# Patient Record
Sex: FEMALE | Race: WHITE | NOT HISPANIC OR LATINO
[De-identification: names, ages, dates, MRNs, and addresses within clinical notes are randomized per-mention and may not be internally consistent; named-entity substitution may affect disease eponyms.]

---

## 2018-04-18 ENCOUNTER — RESULT REVIEW (OUTPATIENT)
Age: 28
End: 2018-04-18

## 2019-04-09 ENCOUNTER — TRANSCRIPTION ENCOUNTER (OUTPATIENT)
Age: 29
End: 2019-04-09

## 2019-04-18 ENCOUNTER — RESULT REVIEW (OUTPATIENT)
Age: 29
End: 2019-04-18

## 2019-05-28 ENCOUNTER — RESULT REVIEW (OUTPATIENT)
Age: 29
End: 2019-05-28

## 2019-09-30 ENCOUNTER — EMERGENCY (EMERGENCY)
Facility: HOSPITAL | Age: 29
LOS: 1 days | Discharge: HOME | End: 2019-09-30
Attending: EMERGENCY MEDICINE | Admitting: EMERGENCY MEDICINE
Payer: COMMERCIAL

## 2019-09-30 VITALS
RESPIRATION RATE: 16 BRPM | TEMPERATURE: 98 F | HEART RATE: 68 BPM | SYSTOLIC BLOOD PRESSURE: 110 MMHG | DIASTOLIC BLOOD PRESSURE: 67 MMHG | OXYGEN SATURATION: 98 %

## 2019-09-30 VITALS
RESPIRATION RATE: 18 BRPM | TEMPERATURE: 98 F | WEIGHT: 134.92 LBS | DIASTOLIC BLOOD PRESSURE: 79 MMHG | OXYGEN SATURATION: 100 % | HEIGHT: 69 IN | HEART RATE: 108 BPM | SYSTOLIC BLOOD PRESSURE: 123 MMHG

## 2019-09-30 LAB — HCG SERPL-ACNC: <1 MIU/ML — SIGNIFICANT CHANGE UP

## 2019-09-30 PROCEDURE — 84702 CHORIONIC GONADOTROPIN TEST: CPT

## 2019-09-30 PROCEDURE — 99283 EMERGENCY DEPT VISIT LOW MDM: CPT

## 2019-09-30 PROCEDURE — 96375 TX/PRO/DX INJ NEW DRUG ADDON: CPT

## 2019-09-30 PROCEDURE — 96361 HYDRATE IV INFUSION ADD-ON: CPT

## 2019-09-30 PROCEDURE — 36415 COLL VENOUS BLD VENIPUNCTURE: CPT

## 2019-09-30 PROCEDURE — 99284 EMERGENCY DEPT VISIT MOD MDM: CPT | Mod: 25

## 2019-09-30 PROCEDURE — 96374 THER/PROPH/DIAG INJ IV PUSH: CPT

## 2019-09-30 RX ORDER — METOCLOPRAMIDE HCL 10 MG
10 TABLET ORAL ONCE
Refills: 0 | Status: COMPLETED | OUTPATIENT
Start: 2019-09-30 | End: 2019-09-30

## 2019-09-30 RX ORDER — KETOROLAC TROMETHAMINE 30 MG/ML
30 SYRINGE (ML) INJECTION ONCE
Refills: 0 | Status: DISCONTINUED | OUTPATIENT
Start: 2019-09-30 | End: 2019-09-30

## 2019-09-30 RX ORDER — SODIUM CHLORIDE 9 MG/ML
1000 INJECTION INTRAMUSCULAR; INTRAVENOUS; SUBCUTANEOUS ONCE
Refills: 0 | Status: COMPLETED | OUTPATIENT
Start: 2019-09-30 | End: 2019-09-30

## 2019-09-30 RX ADMIN — Medication 30 MILLIGRAM(S): at 15:27

## 2019-09-30 RX ADMIN — SODIUM CHLORIDE 1000 MILLILITER(S): 9 INJECTION INTRAMUSCULAR; INTRAVENOUS; SUBCUTANEOUS at 15:27

## 2019-09-30 RX ADMIN — Medication 30 MILLIGRAM(S): at 15:55

## 2019-09-30 RX ADMIN — Medication 10 MILLIGRAM(S): at 14:45

## 2019-09-30 RX ADMIN — SODIUM CHLORIDE 1000 MILLILITER(S): 9 INJECTION INTRAMUSCULAR; INTRAVENOUS; SUBCUTANEOUS at 14:30

## 2019-09-30 NOTE — ED PROVIDER NOTE - PHYSICAL EXAMINATION
Neuro- A&Ox3. Speech clear, without articulation or word-finding difficulties. Eyes- PERRL bilaterally. EOMs in tact. No nystagmus. CN II-XII in tact. No facial droop. No sensory or motor deficits throughout extremities bilaterally. Strength 5/5 throughout upper and lower extremities. No pronator drift. finger-to-nose in tact.

## 2019-09-30 NOTE — ED ADULT NURSE NOTE - EENT ASSESSMENT, MLM
Acetic acid was discontinued at her visit on 2/8/17 and topical wound care was changed to Iodoflex. If patient is requesting this refill, she should call the clinic to discuss.    JANNETH Maddox  
Moi is looking for a refill on   Acetic acid 25% irrig soln.  Not in our MAR  It states to soak wound for 15 minutes prior to application of juan 3 times weekly.  Please advise..  
Received call from [facility] stating a written order was received to continue acetic acid soaks prior to application of Iodoflex. Reviewed patient's paper chart and order found. Rx for Acetic acid 0.25% solution was sent to [facility]'s pharmacy.    JANNETH Maddox  
This should be renewed by the ordering provider who is Alli Mc NP. I will forward this to her.   
- - -

## 2019-09-30 NOTE — ED ADULT NURSE NOTE - OBJECTIVE STATEMENT
pt c/o sudden onset of headache, left eye pain and blurred vision -- with nausea and vomiting - pt with hx of migraines but states she never vomited before with headache -

## 2019-09-30 NOTE — ED PROVIDER NOTE - OBJECTIVE STATEMENT
pt is a 27yo female with pmhx of migraines c/o headache x today. pt reports acute onset of headache with associated eye pain and blurred vision. pt reports 2 episodes of vomiting without blood. pt reports this is her normal migraine except for the vomiting. pt took excedrin for symptoms which provided minimal relief. pt has not seen neuro in "years".

## 2019-09-30 NOTE — ED PROVIDER NOTE - PATIENT PORTAL LINK FT
You can access the FollowMyHealth Patient Portal offered by [facility] by registering at the following website: [email or web address]. By joining [employer]’s FollowMyHealth portal, you will also be able to view your health information using other applications (apps) compatible with our system.

## 2019-09-30 NOTE — ED PROVIDER NOTE - PROGRESS NOTE DETAILS
pt improved. advised neuro follow up. All  labs reviewed. all results reviewed with pt including abnormal results. pt given a copy of results. pt advised to follow up with pmd regarding abnormal results. All questions answered and concerns addressed. pt verbalized understanding and agreement with plan and dx. pt advised on next step and when/where to follow up. pt advised on all take home and otc medications. pt advised to follow up with PMD. pt advised to return to ed for worsenng symptoms including fever, cp, sob. will dc.

## 2019-09-30 NOTE — ED PROVIDER NOTE - CHPI ED SYMPTOMS NEG
no change in level of consciousness/no loss of consciousness/no numbness/no confusion/no weakness/no dizziness/no fever

## 2019-09-30 NOTE — ED PROVIDER NOTE - ATTENDING CONTRIBUTION TO CARE
I have personally performed a face to face diagnostic evaluation on this patient.  I have reviewed the PA note and agree with the history, exam, and plan of care, except as noted.  History and Exam by me shows  left-sided headache from migraine headache c vomiting x 2 today.  Last episode months ago.  HO of headache since childhood.  photophobia.   head nc/at.  lu bl.  pt is feeling better p iv reglan.

## 2019-10-01 RX ORDER — NORETHINDRONE AND ETHINYL ESTRADIOL 0.4-0.035
0 KIT ORAL
Qty: 0 | Refills: 0 | DISCHARGE

## 2019-10-09 NOTE — ED ADULT TRIAGE NOTE - WEIGHT IN LBS
SURGICAL SCHEDULING REQUEST  Atilio Sanchez MD    Diagnosis:   Right  Otosclerosis    Allergies:  ALLERGIES:   Allergen Reactions   • Penicillin G HIVES       BMI:  31.38    Procedure:   Right  Middle Ear Exploration  Middle ear exploration through postauricular or ear canal incision  [17680]  Stapedotomy   Stapedectomy or stapedotomy with reestablishment of ossicular continuity, with or without use of foreign material  [59541]    Surgical Time: 45 MINUTES    Special Surgical Equipment Needed:  Facial Nerve Monitor  CO2 Laser  Surgical [facility] (any BMIs >50 to be done at [facility]    Anesthesia:  General    Other Pertinent Patient Information:  Diabetes: No  Anticoagulation: No    Day of Surgery Medications:  Clindamycin  900 mg  IV  (If Beta-Lactam Allergic)    Needs CXR? No    Needs Medical Clearance? Yes    Needs Cardiac, Pulmonary or other specialty clearance? No     134.9

## 2020-06-30 ENCOUNTER — RESULT REVIEW (OUTPATIENT)
Age: 30
End: 2020-06-30

## 2020-08-22 ENCOUNTER — TRANSCRIPTION ENCOUNTER (OUTPATIENT)
Age: 30
End: 2020-08-22

## 2021-07-07 ENCOUNTER — RESULT REVIEW (OUTPATIENT)
Age: 31
End: 2021-07-07

## 2021-10-05 PROBLEM — G43.909 MIGRAINE, UNSPECIFIED, NOT INTRACTABLE, WITHOUT STATUS MIGRAINOSUS: Chronic | Status: ACTIVE | Noted: 2019-09-30

## 2021-10-05 PROBLEM — Z00.00 ENCOUNTER FOR PREVENTIVE HEALTH EXAMINATION: Status: ACTIVE | Noted: 2021-10-05

## 2021-10-20 ENCOUNTER — APPOINTMENT (OUTPATIENT)
Dept: SURGERY | Facility: CLINIC | Age: 31
End: 2021-10-20

## 2022-06-02 ENCOUNTER — APPOINTMENT (OUTPATIENT)
Dept: SURGERY | Facility: CLINIC | Age: 32
End: 2022-06-02
Payer: COMMERCIAL

## 2022-06-02 VITALS
RESPIRATION RATE: 16 BRPM | HEIGHT: 69 IN | HEART RATE: 83 BPM | OXYGEN SATURATION: 99 % | WEIGHT: 145 LBS | BODY MASS INDEX: 21.48 KG/M2 | DIASTOLIC BLOOD PRESSURE: 75 MMHG | TEMPERATURE: 97.4 F | SYSTOLIC BLOOD PRESSURE: 110 MMHG

## 2022-06-02 DIAGNOSIS — K59.09 OTHER CONSTIPATION: ICD-10-CM

## 2022-06-02 DIAGNOSIS — K60.2 ANAL FISSURE, UNSPECIFIED: ICD-10-CM

## 2022-06-02 DIAGNOSIS — Z80.49 FAMILY HISTORY OF MALIGNANT NEOPLASM OF OTHER GENITAL ORGANS: ICD-10-CM

## 2022-06-02 DIAGNOSIS — Z78.9 OTHER SPECIFIED HEALTH STATUS: ICD-10-CM

## 2022-06-02 DIAGNOSIS — K59.4 ANAL SPASM: ICD-10-CM

## 2022-06-02 PROCEDURE — 99203 OFFICE O/P NEW LOW 30 MIN: CPT

## 2022-06-02 RX ORDER — FLUCONAZOLE 150 MG/1
150 TABLET ORAL
Qty: 1 | Refills: 0 | Status: DISCONTINUED | COMMUNITY
Start: 2022-03-09

## 2022-06-02 NOTE — CONSULT LETTER
[Dear  ___] : Dear ~ALONSO, [Courtesy Letter:] : I had the pleasure of seeing your patient, [unfilled], in my office today. [Please see my note below.] : Please see my note below. [Consult Closing:] : Thank you very much for allowing me to participate in the care of this patient.  If you have any questions, please do not hesitate to contact me. [Sincerely,] : Sincerely, [FreeTextEntry2] : Dr. Jaime Leon [FreeTextEntry3] : Dejan Garcia M.D., CHRISTI.LEXY., F.BRIAN.S.ANGELARMarkS.\[facility] Chief Colorectal Clinical Services, [facility]

## 2022-06-02 NOTE — HISTORY OF PRESENT ILLNESS
[FreeTextEntry1] : Melissa is a 31 year old female here for consultation visit. .\par \par Today patient reports feeling well. States that over the past year she has had issues with constipation. Reports intermittent sharp burning pain with hard BMs. Reports that the pain and itching lingers for a few days after a hard BM. Has used Sitz baths, and hydrocortisone cream with little relief of symptoms. Has had blood dripping into the toilet bowl after BMs when she is straining and has pain.  Currently denies having pain. Has 1 hard BM daily. Denies family history of colorectal cancer. Has never had colonoscopy.

## 2022-06-02 NOTE — ASSESSMENT
[FreeTextEntry1] : I have seen and evaluated patient and I have corroborated all nursing input into this note.  Patient with an intermittent anal fissure.  She has been asymptomatic for the past couple of weeks.  Her fissure occurs when she has hard bowel movements and she has been drinking lots of water and eating a high-fiber diet recently.  Therefore her stool has not been as hard and she has been asymptomatic.  She has failed trials of fiber supplements and MiraLAX in the past.  She plans to try 3-4 stool softeners daily to see if that helps prevent the hard stool and the subsequent fissure symptoms.  I prescribed topical diltiazem cream to use if she becomes symptomatic.  She will follow-up if her symptoms return and are not successfully treated with bowel management and topical diltiazem.

## 2022-06-02 NOTE — PHYSICAL EXAM
[Normal Breath Sounds] : Normal breath sounds [Normal Heart Sounds] : normal heart sounds [No Rash or Lesion] : No rash or lesion [Alert] : alert [Oriented to Person] : oriented to person [Oriented to Place] : oriented to place [Oriented to Time] : oriented to time [Calm] : calm [JVD] : no jugular venous distention  [de-identified] : WNL [de-identified] : WNL [de-identified] : ROM WNL [FreeTextEntry1] : Perianal inspection demonstrated posterior anal canal scarring consistent with a healed fissure.  Digital exam demonstrated posterior anal canal discomfort and sphincter spasm consistent with a fissure.  Anoscopy was deferred because of discomfort.

## 2022-06-30 RX ORDER — NITROGLYCERIN 20 MG/G
2 OINTMENT TOPICAL
Qty: 1 | Refills: 3 | Status: ACTIVE | COMMUNITY
Start: 2022-06-30 | End: 1900-01-01

## 2023-10-25 RX ORDER — DOCUSATE CALCIUM 240 MG
CAPSULE ORAL
Refills: 0 | Status: ACTIVE | COMMUNITY

## 2023-10-26 ENCOUNTER — APPOINTMENT (OUTPATIENT)
Dept: OBGYN | Facility: CLINIC | Age: 33
End: 2023-10-26
Payer: COMMERCIAL

## 2023-10-26 VITALS — SYSTOLIC BLOOD PRESSURE: 116 MMHG | WEIGHT: 159 LBS | DIASTOLIC BLOOD PRESSURE: 60 MMHG | BODY MASS INDEX: 23.48 KG/M2

## 2023-10-26 PROCEDURE — 87210 SMEAR WET MOUNT SALINE/INK: CPT | Mod: QW

## 2023-10-26 PROCEDURE — 99204 OFFICE O/P NEW MOD 45 MIN: CPT | Mod: 25

## 2023-10-26 PROCEDURE — 56820 COLPOSCOPY VULVA: CPT

## 2023-11-03 ENCOUNTER — NON-APPOINTMENT (OUTPATIENT)
Age: 33
End: 2023-11-03

## 2023-11-30 ENCOUNTER — APPOINTMENT (OUTPATIENT)
Dept: OBGYN | Facility: CLINIC | Age: 33
End: 2023-11-30
Payer: COMMERCIAL

## 2023-11-30 VITALS — SYSTOLIC BLOOD PRESSURE: 116 MMHG | DIASTOLIC BLOOD PRESSURE: 62 MMHG

## 2023-11-30 DIAGNOSIS — Z87.19 PERSONAL HISTORY OF OTHER DISEASES OF THE DIGESTIVE SYSTEM: ICD-10-CM

## 2023-11-30 DIAGNOSIS — Z98.890 OTHER SPECIFIED POSTPROCEDURAL STATES: ICD-10-CM

## 2023-11-30 DIAGNOSIS — K59.02 OUTLET DYSFUNCTION CONSTIPATION: ICD-10-CM

## 2023-11-30 DIAGNOSIS — N90.89 OTHER SPECIFIED NONINFLAMMATORY DISORDERS OF VULVA AND PERINEUM: ICD-10-CM

## 2023-11-30 PROCEDURE — 87210 SMEAR WET MOUNT SALINE/INK: CPT | Mod: QW

## 2023-11-30 PROCEDURE — 99213 OFFICE O/P EST LOW 20 MIN: CPT

## 2023-11-30 PROCEDURE — 83986 ASSAY PH BODY FLUID NOS: CPT | Mod: QW

## 2023-12-11 ENCOUNTER — NON-APPOINTMENT (OUTPATIENT)
Age: 33
End: 2023-12-11

## 2023-12-22 ENCOUNTER — APPOINTMENT (OUTPATIENT)
Dept: OBGYN | Facility: CLINIC | Age: 33
End: 2023-12-22
Payer: COMMERCIAL

## 2023-12-22 VITALS — DIASTOLIC BLOOD PRESSURE: 60 MMHG | BODY MASS INDEX: 23.63 KG/M2 | WEIGHT: 160 LBS | SYSTOLIC BLOOD PRESSURE: 100 MMHG

## 2023-12-22 DIAGNOSIS — N89.8 OTHER SPECIFIED NONINFLAMMATORY DISORDERS OF VAGINA: ICD-10-CM

## 2023-12-22 DIAGNOSIS — L29.2 PRURITUS VULVAE: ICD-10-CM

## 2023-12-22 PROCEDURE — 56820 COLPOSCOPY VULVA: CPT

## 2023-12-22 PROCEDURE — 87210 SMEAR WET MOUNT SALINE/INK: CPT | Mod: QW

## 2023-12-22 PROCEDURE — 99213 OFFICE O/P EST LOW 20 MIN: CPT | Mod: 25

## 2023-12-22 RX ORDER — FLUCONAZOLE 200 MG/1
200 TABLET ORAL DAILY
Qty: 30 | Refills: 0 | Status: ACTIVE | COMMUNITY
Start: 2023-12-22 | End: 1900-01-01

## 2023-12-22 NOTE — PLAN
[FreeTextEntry1] : I recommended that she call for the results of her yeast culture in 1 week.  I gave her fluconazole 200 mg p.o. weekly and recommended that she return in 3 months for a repeat evaluation.  Since her itching comes reliably on day 6 of every menses, by that time, if she has not had any itching, her issue can be assumed to be Candida related.  If she continues to itch, her problem can be assumed to be unrelated to Candida (at least some of the time).  The patient is trying to conceive.  If she misses her menses, she should stop the weekly fluconazole.

## 2023-12-22 NOTE — PHYSICAL EXAM
[Chaperone Present] : A chaperone was present in the examining room during all aspects of the physical examination [TextEntry] : ***General*** General Appearance: normal; Judgment and insight: normal; the patient is oriented to time, place and person; Recent and remote memory: normal; Mood and affect: normal; Respiratory effort: normal. ***Pelvic Examination*** External genitalia: the appearance and hair distribution are normal; no lesions are appreciated. Urethra/urethral meatus: no masses or tenderness are appreciated; there is no scarring noted. Bladder: nontender; there is no fullness appreciated; no masses were palpated. Vagina: the vagina is normally rugated; a mucous discharge is seen; no lesions are seen; pelvic support is adequate without any evidence of cystocele or rectocele. Cervix: normal in appearance; no overt lesions are seen. Uterus: Anteverted; normal in size, mobile, nontender, and well supported. Adnexa/parametria: nontender and not enlarged; no masses are palpated. A stool specimen was not indicated at this time.  ***Vaginal Discharge Evaluation*** A saline wet mount and KOH slide evaluation of the vaginal discharge were done. The discharge was mucous; the pH was normal; the whiff test was negative; clue cells were not seen; hyphae were not seen; a moderate number of lactobacilli were seen; a few white blood cells were seen; superficial cells were seen; a candida culture was sent.  ***colposcopy of the vulva*** Colposcopy of the external genitalia showed that the labia majora and labia minora were normal. She identified the introitus as the location of her pain/itching/irritation. There was no vestibular tenderness of the anterior minor vestibular glands, and no vestibular tenderness of the posterior minor vestibular glands. There was no evidence of other dermatopathology. There was no erythema of the introitus. There was moderate to marked pelvic floor spasm on examination.

## 2023-12-22 NOTE — HISTORY OF PRESENT ILLNESS
[FreeTextEntry1] : The patient has a history of recurrent vulvar itching with at least some cultures positive for Candida.  On October 26, her yeast culture was negative but on November 30, her culture was positive for Candida albicans sensitive to fluconazole.  2 weeks ago, her regular gynecologist treated her with Terazol 3.  Her symptoms resolved but now have recurred.

## 2023-12-22 NOTE — ASSESSMENT
[TextEntry] : The patient has recurrent vulvar itching (always on day 6 of her menses).  Some evaluations showed Candida while others were negative.  2 weeks ago, she was treated with Terazol for fluconazole sensitive Candida albicans.  Today, on day 6 of her menses, she is itching but there is no evidence of Candida (pending culture).  She has already eliminated irritants.  She either has occult Candida, hypersensitivity to Candida or another cause of itching. 24  minutes of total time was spent on the date of the encounter. This included time for review of medical records and laboratory results, face to face time (including the physical examination counseling and coordination of care), time for patient education, treatment plans, answering questions, communicating with other doctors and for medical record documentation.  The time spent is separate from time spent on separately billed procedures.

## 2024-09-24 ENCOUNTER — APPOINTMENT (OUTPATIENT)
Dept: PEDIATRICS | Facility: CLINIC | Age: 34
End: 2024-09-24
Payer: COMMERCIAL

## 2024-09-24 PROCEDURE — 99203 OFFICE O/P NEW LOW 30 MIN: CPT

## 2025-06-24 ENCOUNTER — INPATIENT (INPATIENT)
Facility: HOSPITAL | Age: 35
LOS: 0 days | Discharge: HOME | DRG: 395 | End: 2025-06-25
Attending: SURGERY | Admitting: SURGERY
Payer: COMMERCIAL

## 2025-06-24 VITALS
HEIGHT: 69 IN | DIASTOLIC BLOOD PRESSURE: 76 MMHG | WEIGHT: 154.98 LBS | OXYGEN SATURATION: 98 % | HEART RATE: 92 BPM | RESPIRATION RATE: 17 BRPM | SYSTOLIC BLOOD PRESSURE: 105 MMHG | TEMPERATURE: 98 F

## 2025-06-24 DIAGNOSIS — K35.80 UNSPECIFIED ACUTE APPENDICITIS: ICD-10-CM

## 2025-06-24 LAB
ALBUMIN SERPL ELPH-MCNC: 3.9 G/DL — SIGNIFICANT CHANGE UP (ref 3.3–5)
ALP SERPL-CCNC: 82 U/L — SIGNIFICANT CHANGE UP (ref 40–120)
ALT FLD-CCNC: 23 U/L — SIGNIFICANT CHANGE UP (ref 12–78)
ANION GAP SERPL CALC-SCNC: 6 MMOL/L — SIGNIFICANT CHANGE UP (ref 5–17)
APTT BLD: 33.6 SEC — SIGNIFICANT CHANGE UP (ref 26.1–36.8)
AST SERPL-CCNC: 23 U/L — SIGNIFICANT CHANGE UP (ref 15–37)
BASOPHILS # BLD AUTO: 0.02 K/UL — SIGNIFICANT CHANGE UP (ref 0–0.2)
BASOPHILS NFR BLD AUTO: 0.3 % — SIGNIFICANT CHANGE UP (ref 0–2)
BILIRUB SERPL-MCNC: 0.5 MG/DL — SIGNIFICANT CHANGE UP (ref 0.2–1.2)
BUN SERPL-MCNC: 20 MG/DL — SIGNIFICANT CHANGE UP (ref 7–23)
CALCIUM SERPL-MCNC: 9.1 MG/DL — SIGNIFICANT CHANGE UP (ref 8.5–10.1)
CHLORIDE SERPL-SCNC: 104 MMOL/L — SIGNIFICANT CHANGE UP (ref 96–108)
CO2 SERPL-SCNC: 30 MMOL/L — SIGNIFICANT CHANGE UP (ref 22–31)
CREAT SERPL-MCNC: 0.9 MG/DL — SIGNIFICANT CHANGE UP (ref 0.5–1.3)
EGFR: 87 ML/MIN/1.73M2 — SIGNIFICANT CHANGE UP
EGFR: 87 ML/MIN/1.73M2 — SIGNIFICANT CHANGE UP
EOSINOPHIL # BLD AUTO: 0.09 K/UL — SIGNIFICANT CHANGE UP (ref 0–0.5)
EOSINOPHIL NFR BLD AUTO: 1.4 % — SIGNIFICANT CHANGE UP (ref 0–6)
GLUCOSE SERPL-MCNC: 89 MG/DL — SIGNIFICANT CHANGE UP (ref 70–99)
HCG SERPL-ACNC: <1 MIU/ML — SIGNIFICANT CHANGE UP
HCT VFR BLD CALC: 38.6 % — SIGNIFICANT CHANGE UP (ref 34.5–45)
HGB BLD-MCNC: 13.8 G/DL — SIGNIFICANT CHANGE UP (ref 11.5–15.5)
IMM GRANULOCYTES # BLD AUTO: 0.02 K/UL — SIGNIFICANT CHANGE UP (ref 0–0.07)
IMM GRANULOCYTES NFR BLD AUTO: 0.3 % — SIGNIFICANT CHANGE UP (ref 0–0.9)
INR BLD: 0.97 RATIO — SIGNIFICANT CHANGE UP (ref 0.85–1.16)
LYMPHOCYTES # BLD AUTO: 2.22 K/UL — SIGNIFICANT CHANGE UP (ref 1–3.3)
LYMPHOCYTES NFR BLD AUTO: 33.9 % — SIGNIFICANT CHANGE UP (ref 13–44)
MCHC RBC-ENTMCNC: 30.6 PG — SIGNIFICANT CHANGE UP (ref 27–34)
MCHC RBC-ENTMCNC: 35.8 G/DL — SIGNIFICANT CHANGE UP (ref 32–36)
MCV RBC AUTO: 85.6 FL — SIGNIFICANT CHANGE UP (ref 80–100)
MONOCYTES # BLD AUTO: 0.41 K/UL — SIGNIFICANT CHANGE UP (ref 0–0.9)
MONOCYTES NFR BLD AUTO: 6.3 % — SIGNIFICANT CHANGE UP (ref 2–14)
NEUTROPHILS # BLD AUTO: 3.79 K/UL — SIGNIFICANT CHANGE UP (ref 1.8–7.4)
NEUTROPHILS NFR BLD AUTO: 57.8 % — SIGNIFICANT CHANGE UP (ref 43–77)
NRBC # BLD AUTO: 0 K/UL — SIGNIFICANT CHANGE UP (ref 0–0)
NRBC # FLD: 0 K/UL — SIGNIFICANT CHANGE UP (ref 0–0)
NRBC BLD AUTO-RTO: 0 /100 WBCS — SIGNIFICANT CHANGE UP (ref 0–0)
PLATELET # BLD AUTO: 141 K/UL — LOW (ref 150–400)
PMV BLD: 10.9 FL — SIGNIFICANT CHANGE UP (ref 7–13)
POTASSIUM SERPL-MCNC: 4 MMOL/L — SIGNIFICANT CHANGE UP (ref 3.5–5.3)
POTASSIUM SERPL-SCNC: 4 MMOL/L — SIGNIFICANT CHANGE UP (ref 3.5–5.3)
PROT SERPL-MCNC: 7.8 G/DL — SIGNIFICANT CHANGE UP (ref 6–8.3)
PROTHROM AB SERPL-ACNC: 11.3 SEC — SIGNIFICANT CHANGE UP (ref 9.9–13.4)
RBC # BLD: 4.51 M/UL — SIGNIFICANT CHANGE UP (ref 3.8–5.2)
RBC # FLD: 12.3 % — SIGNIFICANT CHANGE UP (ref 10.3–14.5)
SODIUM SERPL-SCNC: 140 MMOL/L — SIGNIFICANT CHANGE UP (ref 135–145)
WBC # BLD: 6.55 K/UL — SIGNIFICANT CHANGE UP (ref 3.8–10.5)
WBC # FLD AUTO: 6.55 K/UL — SIGNIFICANT CHANGE UP (ref 3.8–10.5)

## 2025-06-24 PROCEDURE — 74177 CT ABD & PELVIS W/CONTRAST: CPT

## 2025-06-24 PROCEDURE — 86900 BLOOD TYPING SEROLOGIC ABO: CPT

## 2025-06-24 PROCEDURE — 80053 COMPREHEN METABOLIC PANEL: CPT

## 2025-06-24 PROCEDURE — 36415 COLL VENOUS BLD VENIPUNCTURE: CPT

## 2025-06-24 PROCEDURE — 74177 CT ABD & PELVIS W/CONTRAST: CPT | Mod: 26

## 2025-06-24 PROCEDURE — 86850 RBC ANTIBODY SCREEN: CPT

## 2025-06-24 PROCEDURE — 85610 PROTHROMBIN TIME: CPT

## 2025-06-24 PROCEDURE — 85730 THROMBOPLASTIN TIME PARTIAL: CPT

## 2025-06-24 PROCEDURE — 86901 BLOOD TYPING SEROLOGIC RH(D): CPT

## 2025-06-24 PROCEDURE — 85025 COMPLETE CBC W/AUTO DIFF WBC: CPT

## 2025-06-24 PROCEDURE — 99285 EMERGENCY DEPT VISIT HI MDM: CPT

## 2025-06-24 PROCEDURE — 84702 CHORIONIC GONADOTROPIN TEST: CPT

## 2025-06-24 RX ORDER — HYDROMORPHONE/SOD CHLOR,ISO/PF 2 MG/10 ML
0.5 SYRINGE (ML) INJECTION EVERY 4 HOURS
Refills: 0 | Status: DISCONTINUED | OUTPATIENT
Start: 2025-06-24 | End: 2025-06-25

## 2025-06-24 RX ORDER — PIPERACILLIN-TAZO-DEXTROSE,ISO 3.375G/5
3.38 IV SOLUTION, PIGGYBACK PREMIX FROZEN(ML) INTRAVENOUS ONCE
Refills: 0 | Status: COMPLETED | OUTPATIENT
Start: 2025-06-24 | End: 2025-06-24

## 2025-06-24 RX ORDER — PIPERACILLIN-TAZO-DEXTROSE,ISO 3.375G/5
3.38 IV SOLUTION, PIGGYBACK PREMIX FROZEN(ML) INTRAVENOUS ONCE
Refills: 0 | Status: DISCONTINUED | OUTPATIENT
Start: 2025-06-24 | End: 2025-06-25

## 2025-06-24 RX ORDER — ACETAMINOPHEN 500 MG/5ML
1000 LIQUID (ML) ORAL EVERY 6 HOURS
Refills: 0 | Status: DISCONTINUED | OUTPATIENT
Start: 2025-06-24 | End: 2025-06-25

## 2025-06-24 RX ADMIN — Medication 1000 MILLILITER(S): at 19:55

## 2025-06-24 RX ADMIN — Medication 200 GRAM(S): at 23:34

## 2025-06-24 NOTE — ED PROVIDER NOTE - CLINICAL SUMMARY MEDICAL DECISION MAKING FREE TEXT BOX
Amber ATTG   34-year-old female without any significant past medical problems chief complaint of 1 day of right lower quadrant pain pain started suddenly atraumatically localized to the right lower quadrant does not travel anywhere.  Patient notes that he gets increased if she bends over or presses on it.  Patient has no surgical history besides a  last meal was 1 PM today    GENERAL: Awake, alert, NAD  LUNGS:  non labored breathing   ABDOMEN: tenderness RLQ other Quadrants non tender   BACK: No midline spinal tenderness, no CVA tenderness  EXT: No edema, no calf tenderness,  no deformities.  NEURO: A&Ox3. Moving all extremities.  SKIN: Warm and dry. No rash.  PSYCH: Normal affect.    Given history and physical exam rule out appendicitis  Versus lower concern for gastritis will need CT scan/labs/imaging

## 2025-06-24 NOTE — ED ADULT NURSE NOTE - OBJECTIVE STATEMENT
A/ox4 patient came to ED c.o right lower abd pain that started yesterday worsening with bending over. Patient afebrile, no other medical problems. Pending further labs and radiology reports.

## 2025-06-25 ENCOUNTER — TRANSCRIPTION ENCOUNTER (OUTPATIENT)
Age: 35
End: 2025-06-25

## 2025-06-25 ENCOUNTER — RESULT REVIEW (OUTPATIENT)
Age: 35
End: 2025-06-25

## 2025-06-25 VITALS
DIASTOLIC BLOOD PRESSURE: 75 MMHG | SYSTOLIC BLOOD PRESSURE: 110 MMHG | RESPIRATION RATE: 17 BRPM | OXYGEN SATURATION: 99 %

## 2025-06-25 DIAGNOSIS — K37 UNSPECIFIED APPENDICITIS: ICD-10-CM

## 2025-06-25 LAB
ABO RH CONFIRMATION: SIGNIFICANT CHANGE UP
ANION GAP SERPL CALC-SCNC: 5 MMOL/L — SIGNIFICANT CHANGE UP (ref 5–17)
APTT BLD: 33.6 SEC — SIGNIFICANT CHANGE UP (ref 26.1–36.8)
BASOPHILS # BLD AUTO: 0.03 K/UL — SIGNIFICANT CHANGE UP (ref 0–0.2)
BASOPHILS NFR BLD AUTO: 0.5 % — SIGNIFICANT CHANGE UP (ref 0–2)
BUN SERPL-MCNC: 15 MG/DL — SIGNIFICANT CHANGE UP (ref 7–23)
CALCIUM SERPL-MCNC: 8.5 MG/DL — SIGNIFICANT CHANGE UP (ref 8.5–10.1)
CHLORIDE SERPL-SCNC: 109 MMOL/L — HIGH (ref 96–108)
CO2 SERPL-SCNC: 27 MMOL/L — SIGNIFICANT CHANGE UP (ref 22–31)
CREAT SERPL-MCNC: 0.59 MG/DL — SIGNIFICANT CHANGE UP (ref 0.5–1.3)
EGFR: 123 ML/MIN/1.73M2 — SIGNIFICANT CHANGE UP
EGFR: 123 ML/MIN/1.73M2 — SIGNIFICANT CHANGE UP
EOSINOPHIL # BLD AUTO: 0.12 K/UL — SIGNIFICANT CHANGE UP (ref 0–0.5)
EOSINOPHIL NFR BLD AUTO: 2.2 % — SIGNIFICANT CHANGE UP (ref 0–6)
GLUCOSE SERPL-MCNC: 86 MG/DL — SIGNIFICANT CHANGE UP (ref 70–99)
HCT VFR BLD CALC: 38.3 % — SIGNIFICANT CHANGE UP (ref 34.5–45)
HGB BLD-MCNC: 13.6 G/DL — SIGNIFICANT CHANGE UP (ref 11.5–15.5)
IMM GRANULOCYTES # BLD AUTO: 0.01 K/UL — SIGNIFICANT CHANGE UP (ref 0–0.07)
IMM GRANULOCYTES NFR BLD AUTO: 0.2 % — SIGNIFICANT CHANGE UP (ref 0–0.9)
INR BLD: 0.98 RATIO — SIGNIFICANT CHANGE UP (ref 0.85–1.16)
LYMPHOCYTES # BLD AUTO: 2.33 K/UL — SIGNIFICANT CHANGE UP (ref 1–3.3)
LYMPHOCYTES NFR BLD AUTO: 42.1 % — SIGNIFICANT CHANGE UP (ref 13–44)
MCHC RBC-ENTMCNC: 30.4 PG — SIGNIFICANT CHANGE UP (ref 27–34)
MCHC RBC-ENTMCNC: 35.5 G/DL — SIGNIFICANT CHANGE UP (ref 32–36)
MCV RBC AUTO: 85.5 FL — SIGNIFICANT CHANGE UP (ref 80–100)
MONOCYTES # BLD AUTO: 0.4 K/UL — SIGNIFICANT CHANGE UP (ref 0–0.9)
MONOCYTES NFR BLD AUTO: 7.2 % — SIGNIFICANT CHANGE UP (ref 2–14)
NEUTROPHILS # BLD AUTO: 2.65 K/UL — SIGNIFICANT CHANGE UP (ref 1.8–7.4)
NEUTROPHILS NFR BLD AUTO: 47.8 % — SIGNIFICANT CHANGE UP (ref 43–77)
NRBC # BLD AUTO: 0 K/UL — SIGNIFICANT CHANGE UP (ref 0–0)
NRBC # FLD: 0 K/UL — SIGNIFICANT CHANGE UP (ref 0–0)
NRBC BLD AUTO-RTO: 0 /100 WBCS — SIGNIFICANT CHANGE UP (ref 0–0)
PLATELET # BLD AUTO: 137 K/UL — LOW (ref 150–400)
PMV BLD: 10.4 FL — SIGNIFICANT CHANGE UP (ref 7–13)
POTASSIUM SERPL-MCNC: 3.7 MMOL/L — SIGNIFICANT CHANGE UP (ref 3.5–5.3)
POTASSIUM SERPL-SCNC: 3.7 MMOL/L — SIGNIFICANT CHANGE UP (ref 3.5–5.3)
PROTHROM AB SERPL-ACNC: 11.5 SEC — SIGNIFICANT CHANGE UP (ref 9.9–13.4)
RBC # BLD: 4.48 M/UL — SIGNIFICANT CHANGE UP (ref 3.8–5.2)
RBC # FLD: 12.3 % — SIGNIFICANT CHANGE UP (ref 10.3–14.5)
SODIUM SERPL-SCNC: 141 MMOL/L — SIGNIFICANT CHANGE UP (ref 135–145)
WBC # BLD: 5.54 K/UL — SIGNIFICANT CHANGE UP (ref 3.8–10.5)
WBC # FLD AUTO: 5.54 K/UL — SIGNIFICANT CHANGE UP (ref 3.8–10.5)

## 2025-06-25 PROCEDURE — 85730 THROMBOPLASTIN TIME PARTIAL: CPT

## 2025-06-25 PROCEDURE — 86901 BLOOD TYPING SEROLOGIC RH(D): CPT

## 2025-06-25 PROCEDURE — 36415 COLL VENOUS BLD VENIPUNCTURE: CPT

## 2025-06-25 PROCEDURE — 44970 LAPAROSCOPY APPENDECTOMY: CPT | Mod: AS

## 2025-06-25 PROCEDURE — 74177 CT ABD & PELVIS W/CONTRAST: CPT

## 2025-06-25 PROCEDURE — 96374 THER/PROPH/DIAG INJ IV PUSH: CPT

## 2025-06-25 PROCEDURE — 86850 RBC ANTIBODY SCREEN: CPT

## 2025-06-25 PROCEDURE — 80048 BASIC METABOLIC PNL TOTAL CA: CPT

## 2025-06-25 PROCEDURE — 85610 PROTHROMBIN TIME: CPT

## 2025-06-25 PROCEDURE — 86900 BLOOD TYPING SEROLOGIC ABO: CPT

## 2025-06-25 PROCEDURE — 88304 TISSUE EXAM BY PATHOLOGIST: CPT | Mod: 26

## 2025-06-25 PROCEDURE — 44970 LAPAROSCOPY APPENDECTOMY: CPT

## 2025-06-25 PROCEDURE — C1889: CPT

## 2025-06-25 PROCEDURE — 85025 COMPLETE CBC W/AUTO DIFF WBC: CPT

## 2025-06-25 PROCEDURE — 99285 EMERGENCY DEPT VISIT HI MDM: CPT | Mod: 25

## 2025-06-25 PROCEDURE — 80053 COMPREHEN METABOLIC PANEL: CPT

## 2025-06-25 PROCEDURE — 84702 CHORIONIC GONADOTROPIN TEST: CPT

## 2025-06-25 PROCEDURE — 88304 TISSUE EXAM BY PATHOLOGIST: CPT

## 2025-06-25 DEVICE — STAPLER COVIDIEN TRI-STAPLE 60MM TAN RELOAD: Type: IMPLANTABLE DEVICE | Status: FUNCTIONAL

## 2025-06-25 DEVICE — STAPLER COVIDIEN TRI-STAPLE 45MM TAN RELOAD: Type: IMPLANTABLE DEVICE | Status: FUNCTIONAL

## 2025-06-25 RX ORDER — HYDROMORPHONE/SOD CHLOR,ISO/PF 2 MG/10 ML
0.5 SYRINGE (ML) INJECTION
Refills: 0 | Status: DISCONTINUED | OUTPATIENT
Start: 2025-06-25 | End: 2025-06-25

## 2025-06-25 RX ORDER — ACETAMINOPHEN 500 MG/5ML
2 LIQUID (ML) ORAL
Qty: 20 | Refills: 0
Start: 2025-06-25

## 2025-06-25 RX ORDER — ACETAMINOPHEN 500 MG/5ML
1000 LIQUID (ML) ORAL EVERY 6 HOURS
Refills: 0 | Status: DISCONTINUED | OUTPATIENT
Start: 2025-06-25 | End: 2025-06-25

## 2025-06-25 RX ORDER — METOCLOPRAMIDE HCL 10 MG
5 TABLET ORAL ONCE
Refills: 0 | Status: DISCONTINUED | OUTPATIENT
Start: 2025-06-25 | End: 2025-06-25

## 2025-06-25 RX ORDER — SODIUM CHLORIDE 9 G/1000ML
1000 INJECTION, SOLUTION INTRAVENOUS
Refills: 0 | Status: DISCONTINUED | OUTPATIENT
Start: 2025-06-25 | End: 2025-06-25

## 2025-06-25 RX ORDER — PIPERACILLIN-TAZO-DEXTROSE,ISO 3.375G/5
3.38 IV SOLUTION, PIGGYBACK PREMIX FROZEN(ML) INTRAVENOUS ONCE
Refills: 0 | Status: DISCONTINUED | OUTPATIENT
Start: 2025-06-25 | End: 2025-06-25

## 2025-06-25 RX ORDER — DOCUSATE SODIUM 100 MG
1 CAPSULE ORAL
Qty: 20 | Refills: 0
Start: 2025-06-25

## 2025-06-25 RX ORDER — PIPERACILLIN-TAZO-DEXTROSE,ISO 3.375G/5
3.38 IV SOLUTION, PIGGYBACK PREMIX FROZEN(ML) INTRAVENOUS ONCE
Refills: 0 | Status: COMPLETED | OUTPATIENT
Start: 2025-06-25 | End: 2025-06-25

## 2025-06-25 RX ORDER — HYDROMORPHONE/SOD CHLOR,ISO/PF 2 MG/10 ML
0.5 SYRINGE (ML) INJECTION EVERY 4 HOURS
Refills: 0 | Status: DISCONTINUED | OUTPATIENT
Start: 2025-06-25 | End: 2025-06-25

## 2025-06-25 RX ORDER — OXYCODONE HYDROCHLORIDE 30 MG/1
1 TABLET ORAL
Qty: 5 | Refills: 0
Start: 2025-06-25

## 2025-06-25 RX ORDER — IBUPROFEN 200 MG
1 TABLET ORAL
Qty: 20 | Refills: 0
Start: 2025-06-25

## 2025-06-25 RX ORDER — OXYCODONE HYDROCHLORIDE 30 MG/1
5 TABLET ORAL ONCE
Refills: 0 | Status: DISCONTINUED | OUTPATIENT
Start: 2025-06-25 | End: 2025-06-25

## 2025-06-25 RX ADMIN — Medication 25 GRAM(S): at 06:32

## 2025-06-25 NOTE — H&P ADULT - NS ATTEND AMEND GEN_ALL_CORE FT
Case discussed with surgical PA at time of admission.  Labs and imaging personally reviewed.  Findings consistent with early acute appendicitis.  Admitted to my service.    NPO, IVF's, Abx's.  Plan for Lap Appendectomy in AM.

## 2025-06-25 NOTE — DISCHARGE NOTE NURSING/CASE MANAGEMENT/SOCIAL WORK - NSDCPEFALRISK_GEN_ALL_CORE
For information on Fall & Injury Prevention, visit: [email or web address] OR  [email or web address] OR  https://www.cdc.gov/steadi/patient.html

## 2025-06-25 NOTE — DISCHARGE NOTE NURSING/CASE MANAGEMENT/SOCIAL WORK - PATIENT PORTAL LINK FT
You can access the FollowMyHealth Patient Portal offered by [facility] by registering at the following website: [email or web address]. By joining [employer]’s FollowMyHealth portal, you will also be able to view your health information using other applications (apps) compatible with our system.

## 2025-06-25 NOTE — DISCHARGE NOTE NURSING/CASE MANAGEMENT/SOCIAL WORK - FINANCIAL ASSISTANCE
[facility] provides services at a reduced cost to those who are determined to be eligible through [facility]’s financial assistance program. Information regarding [facility]’s financial assistance program can be found by going to [email or web address] or by calling [phone].

## 2025-06-25 NOTE — CARE COORDINATION ASSESSMENT. - OTHER PERTINENT DISCHARGE PLANNING INFORMATION:
met with patient at bedside to introduce self. Role of case manage and transition explained. Patient  verbalized understanding. Patient is from home with her spouse and baby. Admitted for appendicitis, NPO on IVF.  Choice and transition resources explained and given with  information. Patient and caregiver understand that  will remain available.

## 2025-06-25 NOTE — H&P ADULT - PROBLEM SELECTOR PLAN 1
- Given patients clinical presentation including physical exam and radiographic findings, admission to Dr. Salmon for OR for Lap appendectomy tomorrow afternoon  - NPO/IVF  - IV Abx, Zosyn  - IV Analgesia for pain control   - VTE PPx  - Discussed with Dr. Salmon

## 2025-06-25 NOTE — H&P ADULT - ASSESSMENT
34 year old female 8 months post-partum presenting to [place] ED with abdominal pain found to have early appendicitis on CT scan.    VSS,afebrile.  Labs noted for leukocytosis (13k).  Abdominal exam concerning with tenderness in RLQ, however without peritoneal signs.

## 2025-06-25 NOTE — H&P ADULT - NSHPSOCIALHISTORY_GEN_ALL_CORE
Denies smoking, or illicit drug use  Drinks alcohol socially, has not drank since before pregnancy.    Works as teacher.

## 2025-06-25 NOTE — DISCHARGE NOTE PROVIDER - NSDCFUADDINST_GEN_ALL_CORE_FT
You may remove band-aids and take a shower in 24 hours.  Do not remove steri-strips.  Do not let water hit wounds directly, do not rub incisions.      Do not drive for 24 hours after surgery.  Do not drive if taking narcotic pain medications.  Do not drive until pain-free.      No heavy lifting (nothing heavier than 5 lbs.), no strenuous physical activity, no exercise.      You are encouraged to walk as much as possible to prevent blood clots in the legs, to maintain lung health after surgery/anesthesia, and to prevent constipation after surgery.      Continue to use the incentive spirometer at home.    You may perform your usual daily tasks as tolerated.      You may resume your usual daily diet as tolerated.    Take percocet as prescribed for pain.  Take colace as prescribed to prevent constipation while taking percocet.  You may take over-the-counter Motrin 200mg for mild to moderate pain (take 2 pills every 6 hours with food for mild, take 3 pills every 6 hours with food for moderate).      Resume all your regular home medications as instructed in the discharge medication reconciliation    Follow-up with Dr. Salmon in his office next week - call office for appointment if not already made.

## 2025-06-25 NOTE — H&P ADULT - HISTORY OF PRESENT ILLNESS
34 year old female 8 months post-partum presenting to [place] ED with abdominal pain.  Patient states pain started on Tuesday morning described as crampy pain located in RLQ without radiation of symptoms.  Patient states she saw her PMD earlier who advised to come to ED for further evaluation.  Since pain began she has been able to tolerate a regular diet without nausea or vomiting, she has been passing flatus last a BM on Tuesday morning.  She Denies fevers, chills, chest pain, SOB, palpitations, calf pain.   34 year old female 8 months post-partum ( section) presenting to [place] ED with abdominal pain.  Patient states pain started on Tuesday morning described as crampy pain located in RLQ without radiation of symptoms.  Patient states she saw her PMD earlier who advised to come to ED for further evaluation.  Since pain began she has been able to tolerate a regular diet without nausea or vomiting, she has been passing flatus last a BM on Tuesday morning.  She Denies fevers, chills, chest pain, SOB, palpitations, calf pain.

## 2025-06-25 NOTE — PATIENT PROFILE ADULT - NSPRESCRALCSCORE_GEN_A_NUR_CAL
1. Start Saxenda (daily injection for long term weight loss management)    2. Start 0.6mg once a day x 1 week, then increase to 1.2mg daily x 1 week, then 1.8mg daily x 1 week, then 2.4mg daily x 1 week, then continue 3mg daily as tolerated    3. For any severe abdominal pains, nausea, vomiting, or neck concerns to let me know    4. Update fasting labs any time    5. Continue healthy diet and regular exercise at least 30 minutes.    Follow up in 2 months   
1

## 2025-06-25 NOTE — DISCHARGE NOTE PROVIDER - CARE PROVIDER_API CALL
Tenzin Salmon  Surgery (General Surgery)  [address] [place] [id number]  Phone: [phone]  Fax: [phone]  Follow Up Time: 1 week

## 2025-06-25 NOTE — H&P ADULT - NSHPPHYSICALEXAM_GEN_ALL_CORE
PHYSICAL EXAM:  GENERAL: NAD, lying in bed comfortably  HEAD:  Atraumatic, Normocephalic  EYES: EOMI, PERRLA, conjunctiva and sclera clear  ENT: Moist mucous membranes  NECK: Supple, No JVD  CHEST/LUNG: Clear to auscultation bilaterally; No rales, rhonchi, wheezing, or rubs. Unlabored respirations  HEART: Regular rate and rhythm; No murmurs, rubs, or gallops  ABDOMEN: Bowel sounds present; Soft, TTP in RLQ, Nondistended. No hepatomegally  EXTREMITIES:  2+ Peripheral Pulses, brisk capillary refill. No clubbing, cyanosis, or edema  NERVOUS SYSTEM:  Alert & Oriented X3, speech clear. No deficits   MSK: FROM all 4 extremities, full and equal strength  SKIN: No rashes or lesions

## 2025-06-25 NOTE — PROGRESS NOTE ADULT - NS ATTEND AMEND GEN_ALL_CORE FT
Pt seen this morning.   at bedside.   Discussed labs and imaging results and recommendation for Appendectomy.  Pertinent surgical anatomy and techniques reviewed, Risk, Benefits, and Alternatives to surgery have been discussed.  This includes but is not limited to bleeding, infection, damage to adjacent structures, need for additional surgery or interventions, adverse effects of anesthesia such as cardio-respiratory complications, prolonged intubation, cardiac arrhythmia, arrest, and or death.  Risks of forgoing surgery have also been discussed including progression of, and/or worsening of current condition which may then require urgent or emergent treatment or surgery.  Anticipated post operative course and restrictions discussed as well.  All questions answered.  Proceed with Appendectomy this morning.  Disposition pending operative findings.  Anticipate discharge to home later today.

## 2025-06-25 NOTE — DISCHARGE NOTE PROVIDER - NSDCHOSPICE_GEN_A_CORE
Returned call to patient's sister, Ms. Kohli and per MD he will call patient and explain the need to go to ER.    No

## 2025-06-25 NOTE — PRE-OP CHECKLIST - BP NONINVASIVE DIASTOLIC (MM HG)
Patient called and we set a date for 10/16/2024 and she is calling to set up her pre-op appointment. She will call me back with that date and time. I told her that I will put the information in the mail for her.  
68

## 2025-06-25 NOTE — PROGRESS NOTE ADULT - SUBJECTIVE AND OBJECTIVE BOX
SURGERY PA NOTE ON BEHALF OF DR. Salmon/ Gen SURGERY:    S: Patient seen and examined at bedside.   Pt reports continues to have pain, however manageable. Remains NPO for OR today    MEDICATIONS:  acetaminophen     Tablet .. 1000 milliGRAM(s) Oral every 6 hours PRN  HYDROmorphone  Injectable 0.5 milliGRAM(s) IV Push every 4 hours PRN  sodium chloride 0.9%. 1000 milliLiter(s) IV Continuous <Continuous>      O:  Vital Signs Last 24 Hrs  T(C): 36.7 (25 Jun 2025 05:45), Max: 36.9 (25 Jun 2025 01:53)  T(F): 98.1 (25 Jun 2025 05:45), Max: 98.5 (25 Jun 2025 01:53)  HR: 74 (25 Jun 2025 05:45) (74 - 92)  BP: 104/64 (25 Jun 2025 05:45) (104/64 - 105/76)  BP(mean): --  RR: 17 (25 Jun 2025 05:45) (17 - 18)  SpO2: 96% (25 Jun 2025 05:45) (96% - 98%)    Parameters below as of 25 Jun 2025 05:45  Patient On (Oxygen Delivery Method): room air        I&O SUMMARY:      PHYSICAL EXAM:  Lungs: CTA bilat    Card: S1S2  Abd: Soft, ND.  TTP RLQ.  No rebound/guarding.    Ext: Calves soft, NT, without edema bilat    LABS:                        13.6   5.54  )-----------( 137      ( 25 Jun 2025 04:25 )             38.3     06-25    141  |  109[H]  |  15  ----------------------------<  86  3.7   |  27  |  0.59    Ca    8.5      25 Jun 2025 04:25    TPro  7.8  /  Alb  3.9  /  TBili  0.5  /  DBili  x   /  AST  23  /  ALT  23  /  AlkPhos  82  06-24    PT/INR - ( 25 Jun 2025 04:25 )   PT: 11.5 sec;   INR: 0.98 ratio         PTT - ( 25 Jun 2025 04:25 )  PTT:33.6 sec          RADIOLOGY:    < from: CT Abdomen and Pelvis w/ IV Cont (06.24.25 @ 21:03) >  ACC: [id number] EXAM:  CT ABDOMEN AND PELVIS IC   ORDERED BY: BLANE MNO     PROCEDURE DATE:  06/24/2025          INTERPRETATION:  CLINICAL INFORMATION: Abdominal pain. Evaluate for   appendicitis.    COMPARISON: None.    CONTRAST/COMPLICATIONS:  IV Contrast: Omnipaque 350  90 cc administered   10 cc discarded  Oral Contrast: NONE.    PROCEDURE:  CT of the Abdomen and Pelvis was performed.  Sagittal and coronal reformats were performed.    FINDINGS:  LOWER CHEST: Within normal limits.    LIVER: Within normal limits.  BILE DUCTS: Normal caliber.  GALLBLADDER: Within normal limits.  SPLEEN: Within normal limits.  PANCREAS: Within normal limits.  ADRENALS: Within normal limits.  KIDNEYS/URETERS: Within normal limits.    BLADDER: Underdistended which limits evaluation.  REPRODUCTIVE ORGANS: Uterus is deviated to the right with the right ovary   in the right lower quadrant which could be related to prior ovarian pexy.   Left adnexa within normal limits.    BOWEL: No bowel obstruction.Appendix is dilated to 0.8 cm, has mildly   thickened hyperemic walls and questionable stranding in the adjacent fat.  PERITONEUM/RETROPERITONEUM: Within normal limits.  VESSELS: Within normal limits.  LYMPH NODES: No lymphadenopathy.  ABDOMINAL WALL: Within normal limits.  BONES: Within normal limits.    IMPRESSION:  Mildly dilated, thick walled appendix with questionable inflammation   concerning for early appendicitis.        --- End of Report ---            < end of copied text >

## 2025-06-25 NOTE — H&P ADULT - NSHPLABSRESULTS_GEN_ALL_CORE
13.8   6.55  )-----------( 141      ( 24 Jun 2025 19:13 )             38.6     06-24    140  |  104  |  20  ----------------------------<  89  4.0   |  30  |  0.90    Ca    9.1      24 Jun 2025 19:13    TPro  7.8  /  Alb  3.9  /  TBili  0.5  /  DBili  x   /  AST  23  /  ALT  23  /  AlkPhos  82  06-24      ACC: [id number] EXAM:  CT ABDOMEN AND PELVIS IC   ORDERED BY: BLNAE MON     PROCEDURE DATE:  06/24/2025          INTERPRETATION:  CLINICAL INFORMATION: Abdominal pain. Evaluate for   appendicitis.    COMPARISON: None.    CONTRAST/COMPLICATIONS:  IV Contrast: Omnipaque 350  90 cc administered   10 cc discarded  Oral Contrast: NONE.    PROCEDURE:  CT of the Abdomen and Pelvis was performed.  Sagittal and coronal reformats were performed.    FINDINGS:  LOWER CHEST: Within normal limits.    LIVER: Within normal limits.  BILE DUCTS: Normal caliber.  GALLBLADDER: Within normal limits.  SPLEEN: Within normal limits.  PANCREAS: Within normal limits.  ADRENALS: Within normal limits.  KIDNEYS/URETERS: Within normal limits.    BLADDER: Underdistended which limits evaluation.  REPRODUCTIVE ORGANS: Uterus is deviated to the right with the right ovary   in the right lower quadrant which could be related to prior ovarian pexy.   Left adnexa within normal limits.    BOWEL: No bowel obstruction.Appendix is dilated to 0.8 cm, has mildly   thickened hyperemic walls and questionable stranding in the adjacent fat.  PERITONEUM/RETROPERITONEUM: Within normal limits.  VESSELS: Within normal limits.  LYMPH NODES: No lymphadenopathy.  ABDOMINAL WALL: Within normal limits.  BONES: Within normal limits.    IMPRESSION:  Mildly dilated, thick walled appendix with questionable inflammation   concerning for early appendicitis.    HERRERA MARMOLEJO MD; Attending Radiologist  This document has been electronically signed. Jun 24 2025 10:02PM

## 2025-06-25 NOTE — PROGRESS NOTE ADULT - ASSESSMENT
34 year old female 8 months post-partum presenting to [facility] ED with abdominal pain found to have early appendicitis on CT scan.      For OR today  NPO p MN confirmed  IVF  Cont abx  To be discussed with Dr Salmon

## 2025-06-25 NOTE — DISCHARGE NOTE PROVIDER - HOSPITAL COURSE
34 year old female 8 months post-partum ( section) presenting to [place] [facility] ED on 25 with abdominal pain.  Patient states pain started on Tuesday morning described as crampy pain located in RLQ without radiation of symptoms.  Patient states she saw her PMD earlier who advised to come to ED for further evaluation.  Since pain began she has been able to tolerate a regular diet without nausea or vomiting, she has been passing flatus last a BM on Tuesday morning.  She Denies fevers, chills, chest pain, SOB, palpitations, calf pain.  VSS,afebrile.  Labs noted for leukocytosis (13k).  Abdominal exam concerning with tenderness in RLQ, however without peritoneal signs.    Patient underwent laparoscopic appendectomy on 25 without complication. Post op course was uncomplicated and her pain was managed. She is tolerating a regular diet, voiding and ambulating independently.Pt is stable for discharge home with follow-up with Dr. Salmon as outpatient in 1 week.

## 2025-06-25 NOTE — CARE COORDINATION ASSESSMENT. - NSCAREPROVIDERS_GEN_ALL_CORE_FT
CARE PROVIDERS:  Accepting Physician: Tenzin Salmon  Access Services: James Gongora  Administration: Shailesh Hall  Administration: Kentrell Leroy  Admitting: Tenzin Salmon  Attending: Tenzin Salmon  Case Management: Ting Stuart  Consultant: Saloni Paul  Consultant: Ayan Kincaid  Consultant: Lit Olmos  Consultant: Nova Wall  Covering Team: Sandra Granda ED Attending: Jean Claude Clark ED Nurse: Lilly Alvarez  Nurse: Kirstin Christensen  Nurse: Lilly Alvarez  Nurse: Barry Giraldo  Nurse: Eric Raya  Nurse: Emily Mccoy  Nurse: Vale Orellana  Nurse: Kamini White  Override: Kirstin Christensen  Override: Lilly Alvarez  Override: Candy Reyes  PCA/Nursing Assistant: Candy Reyes  Physical Therapy: Ann Llamas  Primary Team: Bonita Echeverria  Registered Dietitian: Li Deng  Student: Kaya Munroe

## 2025-06-25 NOTE — DISCHARGE NOTE PROVIDER - NSDCMRMEDTOKEN_GEN_ALL_CORE_FT
Junel 1.5/30:    Colace 100 mg oral capsule: 1 cap(s) orally 3 times a day as needed for  constipation  ibuprofen 600 mg oral tablet: 1 tab(s) orally every 6 hours  Junel 1.5/30:   oxyCODONE 5 mg oral tablet: 1 tab(s) orally every 6 hours as needed for  severe pain MDD: 4 tab  Tylenol Extra Strength 500 mg oral tablet: 2 tab(s) orally every 6 hours

## 2025-06-25 NOTE — H&P ADULT - NSHPREVIEWOFSYSTEMS_GEN_ALL_CORE
REVIEW OF SYSTEMS:    CONSTITUTIONAL: No weakness, fevers or chills  EYES/ENT: No visual changes;  No vertigo or throat pain   NECK: No pain or stiffness  RESPIRATORY: No cough, wheezing, hemoptysis; No shortness of breath  CARDIOVASCULAR: No chest pain or palpitations  GASTROINTESTINAL: abdominal pain.   No nausea, vomiting, or hematemesis; No diarrhea or constipation. No melena or hematochezia.  GENITOURINARY: No dysuria, frequency or hematuria  NEUROLOGICAL: No numbness or weakness  SKIN: No itching, burning, rashes, or lesions   All other review of systems is negative unless indicated above.

## 2025-06-27 LAB — SURGICAL PATHOLOGY STUDY: SIGNIFICANT CHANGE UP

## 2025-07-01 ENCOUNTER — NON-APPOINTMENT (OUTPATIENT)
Age: 35
End: 2025-07-01

## 2025-07-01 ENCOUNTER — APPOINTMENT (OUTPATIENT)
Dept: SURGERY | Facility: CLINIC | Age: 35
End: 2025-07-01
Payer: COMMERCIAL

## 2025-07-01 VITALS
WEIGHT: 155 LBS | TEMPERATURE: 97.9 F | DIASTOLIC BLOOD PRESSURE: 71 MMHG | BODY MASS INDEX: 22.96 KG/M2 | HEIGHT: 69 IN | OXYGEN SATURATION: 97 % | SYSTOLIC BLOOD PRESSURE: 106 MMHG | HEART RATE: 79 BPM

## 2025-07-01 PROCEDURE — 99024 POSTOP FOLLOW-UP VISIT: CPT

## (undated) DEVICE — SUT MONOCRYL 4-0 27" PS-2 UNDYED

## (undated) DEVICE — SOL IRR POUR H2O 1000ML

## (undated) DEVICE — Device

## (undated) DEVICE — WARMING BLANKET UPPER ADULT

## (undated) DEVICE — SOL IRR BAG NS 0.9% 3000ML

## (undated) DEVICE — TROCAR COVIDIEN VERSAPORT BLADELESS OPTICAL 5MM STANDARD

## (undated) DEVICE — ELCTR BOVIE TIP BLADE INSULATED 2.75" EDGE

## (undated) DEVICE — DRSG OPSITE 2.5 X 2"

## (undated) DEVICE — DRSG STERISTRIPS 0.5 X 4"

## (undated) DEVICE — DRAPE TOWEL BLUE 17" X 24"

## (undated) DEVICE — LIGASURE L-HOOK 44CM

## (undated) DEVICE — DRAPE 3/4 SHEET W REINFORCEMENT 56X77"

## (undated) DEVICE — BLADE SCALPEL SAFETYLOCK #15

## (undated) DEVICE — SUT POLYSORB 3-0 30" V-20 UNDYED

## (undated) DEVICE — GLV 7 PROTEXIS (WHITE)

## (undated) DEVICE — TROCAR COVIDIEN VERSAONE BLUNT TIP HASSAN 12MM

## (undated) DEVICE — STAPLER COVIDIEN ENDO GIA STANDARD HANDLE

## (undated) DEVICE — PLV/PSP-SUCTION IRRIGATOR STRYKER: Type: DURABLE MEDICAL EQUIPMENT

## (undated) DEVICE — TROCAR COVIDIEN VERSAONE FIXATION CANNULA 5MM

## (undated) DEVICE — SUT POLYSORB 0 30" GU-46

## (undated) DEVICE — ENDOCATCH 10MM

## (undated) DEVICE — ELCTR BOVIE PENCIL SMOKE EVACUATION

## (undated) DEVICE — TUBING STRYKER PNEUMOSURE HI FLOW INSUFFLATOR

## (undated) DEVICE — VENODYNE/SCD SLEEVE CALF MEDIUM

## (undated) DEVICE — VENODYNE/SCD SLEEVE CALF LARGE

## (undated) DEVICE — PLV-SCD MACHINE: Type: DURABLE MEDICAL EQUIPMENT

## (undated) DEVICE — SMOKE EVACUTATION SYS LAPROSCOPIC AC/PA

## (undated) DEVICE — D HELP - CLEARVIEW CLEARIFY SYSTEM

## (undated) DEVICE — GLV 7.5 PROTEXIS (WHITE)

## (undated) DEVICE — PACK GENERAL LAPAROSCOPY